# Patient Record
Sex: FEMALE | Race: WHITE
[De-identification: names, ages, dates, MRNs, and addresses within clinical notes are randomized per-mention and may not be internally consistent; named-entity substitution may affect disease eponyms.]

---

## 2017-07-15 ENCOUNTER — HOSPITAL ENCOUNTER (OUTPATIENT)
Dept: HOSPITAL 53 - M SFHCLERA | Age: 42
End: 2017-07-15
Attending: NURSE PRACTITIONER
Payer: COMMERCIAL

## 2017-07-15 DIAGNOSIS — R30.0: Primary | ICD-10-CM

## 2018-07-21 ENCOUNTER — HOSPITAL ENCOUNTER (OUTPATIENT)
Dept: HOSPITAL 53 - M SFHCLERA | Age: 43
End: 2018-07-21
Attending: NURSE PRACTITIONER
Payer: COMMERCIAL

## 2018-07-21 DIAGNOSIS — R30.0: Primary | ICD-10-CM

## 2018-09-12 ENCOUNTER — HOSPITAL ENCOUNTER (OUTPATIENT)
Dept: HOSPITAL 53 - M SFHCLERA | Age: 43
End: 2018-09-12
Attending: NURSE PRACTITIONER
Payer: COMMERCIAL

## 2018-09-12 DIAGNOSIS — J02.9: Primary | ICD-10-CM

## 2019-07-02 ENCOUNTER — HOSPITAL ENCOUNTER (OUTPATIENT)
Dept: HOSPITAL 53 - M LAB | Age: 44
End: 2019-07-02
Attending: NURSE PRACTITIONER
Payer: COMMERCIAL

## 2019-07-02 DIAGNOSIS — E11.9: Primary | ICD-10-CM

## 2019-07-02 LAB
ALBUMIN SERPL BCG-MCNC: 3 GM/DL (ref 3.2–5.2)
ALT SERPL W P-5'-P-CCNC: 16 U/L (ref 12–78)
BILIRUB SERPL-MCNC: 0.3 MG/DL (ref 0.2–1)
BUN SERPL-MCNC: 13 MG/DL (ref 7–18)
CALCIUM SERPL-MCNC: 8.9 MG/DL (ref 8.5–10.1)
CHLORIDE SERPL-SCNC: 105 MEQ/L (ref 98–107)
CHOLEST SERPL-MCNC: 178 MG/DL (ref ?–200)
CHOLEST/HDLC SERPL: 3.56 {RATIO} (ref ?–5)
CO2 SERPL-SCNC: 25 MEQ/L (ref 21–32)
CREAT SERPL-MCNC: 0.58 MG/DL (ref 0.55–1.3)
GFR SERPL CREATININE-BSD FRML MDRD: > 60 ML/MIN/{1.73_M2} (ref 58–?)
GLUCOSE SERPL-MCNC: 102 MG/DL (ref 70–100)
HCT VFR BLD AUTO: 38.5 % (ref 36–47)
HDLC SERPL-MCNC: 50 MG/DL (ref 40–?)
HGB BLD-MCNC: 11.8 G/DL (ref 12–15.5)
LDLC SERPL CALC-MCNC: 84 MG/DL (ref ?–100)
MCH RBC QN AUTO: 23.9 PG (ref 27–33)
MCHC RBC AUTO-ENTMCNC: 30.6 G/DL (ref 32–36.5)
MCV RBC AUTO: 78.1 FL (ref 80–96)
NONHDLC SERPL-MCNC: 128 MG/DL
PLATELET # BLD AUTO: 493 10^3/UL (ref 150–450)
POTASSIUM SERPL-SCNC: 4.7 MEQ/L (ref 3.5–5.1)
PROT SERPL-MCNC: 6.6 GM/DL (ref 6.4–8.2)
RBC # BLD AUTO: 4.93 10^6/UL (ref 4–5.4)
SODIUM SERPL-SCNC: 139 MEQ/L (ref 136–145)
TRIGL SERPL-MCNC: 220 MG/DL (ref ?–150)
WBC # BLD AUTO: 12.4 10^3/UL (ref 4–10)

## 2019-11-02 ENCOUNTER — HOSPITAL ENCOUNTER (OUTPATIENT)
Dept: HOSPITAL 53 - M SFHCLERA | Age: 44
End: 2019-11-02
Attending: PHYSICIAN ASSISTANT
Payer: COMMERCIAL

## 2019-11-02 DIAGNOSIS — R30.9: Primary | ICD-10-CM

## 2020-03-24 ENCOUNTER — HOSPITAL ENCOUNTER (OUTPATIENT)
Dept: HOSPITAL 53 - M OPP | Age: 45
Discharge: HOME | End: 2020-03-24
Attending: INTERNAL MEDICINE
Payer: COMMERCIAL

## 2020-03-24 VITALS — WEIGHT: 256.1 LBS | HEIGHT: 64 IN | BODY MASS INDEX: 43.72 KG/M2

## 2020-03-24 VITALS — SYSTOLIC BLOOD PRESSURE: 144 MMHG | DIASTOLIC BLOOD PRESSURE: 66 MMHG

## 2020-03-24 DIAGNOSIS — K64.8: ICD-10-CM

## 2020-03-24 DIAGNOSIS — D12.3: ICD-10-CM

## 2020-03-24 DIAGNOSIS — Z88.2: ICD-10-CM

## 2020-03-24 DIAGNOSIS — E11.9: ICD-10-CM

## 2020-03-24 DIAGNOSIS — K29.70: ICD-10-CM

## 2020-03-24 DIAGNOSIS — K57.30: ICD-10-CM

## 2020-03-24 DIAGNOSIS — D50.9: Primary | ICD-10-CM

## 2020-03-24 DIAGNOSIS — J45.909: ICD-10-CM

## 2020-03-24 DIAGNOSIS — K20.0: ICD-10-CM

## 2020-03-24 DIAGNOSIS — R13.10: ICD-10-CM

## 2020-03-24 DIAGNOSIS — Z79.899: ICD-10-CM

## 2020-03-24 DIAGNOSIS — K21.9: ICD-10-CM

## 2020-03-24 PROCEDURE — 88305 TISSUE EXAM BY PATHOLOGIST: CPT

## 2020-03-24 PROCEDURE — 43239 EGD BIOPSY SINGLE/MULTIPLE: CPT

## 2020-03-24 PROCEDURE — 45385 COLONOSCOPY W/LESION REMOVAL: CPT

## 2020-03-24 NOTE — ROOR
________________________________________________________________________________

Patient Name: Samaria Taylor             Procedure Date: 3/24/2020 9:33 AM

MRN: [id number]                          Account Number: [id number]

YOB: 1975              Age: 44

Room: [facility]                            Gender: Female

Note Status: Finalized                 

________________________________________________________________________________

 

Procedure:           Colonoscopy

Indications:         Iron deficiency anemia

Providers:           Jd Sullivan MD

Referring MD:        JUAN PABLO JEFFREY MD

Requesting Provider: 

Medicines:           Monitored Anesthesia Care

Complications:       No immediate complications.

________________________________________________________________________________

Procedure:           Pre-Anesthesia Assessment:

                     - Prior to the procedure, a History and Physical was 

                     performed, and patient medications and allergies were 

                     reviewed. The patient is competent. The risks and 

                     benefits of the procedure and the sedation options and 

                     risks were discussed with the patient. All questions were 

                     answered and informed consent was obtained. Patient 

                     identification and proposed procedure were verified by 

                     the physician, the nurse and the anesthesiologist in the 

                     procedure room. Mental Status Examination: alert and 

                     oriented. Airway Examination: normal oropharyngeal airway 

                     and neck mobility. Respiratory Examination: clear to 

                     auscultation. CV Examination: normal. Prophylactic 

                     Antibiotics: The patient does not require prophylactic 

                     antibiotics. Prior Anticoagulants: The patient has taken 

                     no previous anticoagulant or antiplatelet agents. ASA 

                     Grade Assessment: II - A patient with mild systemic 

                     disease. After reviewing the risks and benefits, the 

                     patient was deemed in satisfactory condition to undergo 

                     the procedure. The anesthesia plan was to use monitored 

                     anesthesia care (MAC). Immediately prior to 

                     administration of medications, the patient was 

                     re-assessed for adequacy to receive sedatives. The heart 

                     rate, respiratory rate, oxygen saturations, blood 

                     pressure, adequacy of pulmonary ventilation, and response 

                     to care were monitored throughout the procedure. The 

                     physical status of the patient was re-assessed after the 

                     procedure.

                     The Colonoscope was introduced through the anus and 

                     advanced to the terminal ileum, with identification of 

                     the appendiceal orifice and IC valve. The colonoscopy was 

                     performed without difficulty. The patient tolerated the 

                     procedure well. The quality of the bowel preparation was 

                     good. The terminal ileum, ileocecal valve, appendiceal 

                     orifice, and rectum were photographed. Scope insertion 

                     time was 2 minutes. Scope withdrawal time was 8 minutes. 

                     The total duration of the procedure was 12 minutes.

                                                                                

Findings:

     The perianal and digital rectal examinations were normal.

     The terminal ileum appeared normal.

     A 12 mm polyp was found in the transverse colon. The polyp was sessile. 

     The polyp was removed with a hot snare. Resection and retrieval were 

     complete. Verification of patient identification for the specimen was 

     done by the physician and nurse using the patient's name, birth date and 

     medical record number. Estimated blood loss was minimal.

     Multiple small and large-mouthed diverticula were found from sigmoid to 

     descending colon. There was no evidence of diverticular bleeding.

     Non-bleeding external and internal hemorrhoids were found during 

     retroflexion. The hemorrhoids were medium-sized.

                                                                                

Impression:          - The examined portion of the ileum was normal.

                     - One 12 mm polyp in the transverse colon, removed with a 

                     hot snare. Resected and retrieved.

                     - Moderate diverticulosis from sigmoid to descending 

                     colon. There was no evidence of diverticular bleeding.

                     - Non-bleeding external and internal hemorrhoids.

Recommendation:      - Patient has a contact number available for emergencies. 

                     The signs and symptoms of potential delayed complications 

                     were discussed with the patient. Return to normal 

                     activities tomorrow. Written discharge instructions were 

                     provided to the patient.

                     - High fiber diet.

                     - Continue present medications.

                     - Await pathology results.

                     - Repeat colonoscopy in 3 - 5 years for surveillance 

                     based on pathology results.

                     - Check hemoglobin and hematocrit, serum iron, UIBC and 

                     ferritin levels in 3 months.

                     - Telephone GI clinic for pathology results in 2 weeks.

                     - Return to GI clinic in 3 months.

                     - Return to primary care physician.

                                                                                

 

Jd Sullivan MD

_______________________

Jd Sullivan MD

3/24/2020 10:36:41 AM

Electronically signed by Jd Sullivan MD

Number of Addenda: 0

 

Note Initiated On: 3/24/2020 9:33 AM

Estimated Blood Loss:

     Estimated blood loss was minimal.

## 2020-03-24 NOTE — ROOR
________________________________________________________________________________

Patient Name: Samaria Taylor             Procedure Date: 3/24/2020 9:33 AM

MRN: [id number]                          Account Number: [id number]

YOB: 1975              Age: 44

Room: [facility]                            Gender: Female

Note Status: Finalized                 

________________________________________________________________________________

 

Procedure:           Upper GI endoscopy

Indications:         Dysphagia, Follow-up of eosinophilic esophagitis

Providers:           Jd Sullivan MD

Referring MD:        JUAN PABLO JEFFREY MD

Requesting Provider: 

Medicines:           Monitored Anesthesia Care

Complications:       No immediate complications.

________________________________________________________________________________

Procedure:           Pre-Anesthesia Assessment:

                     - Prior to the procedure, a History and Physical was 

                     performed, and patient medications and allergies were 

                     reviewed. The patient is competent. The risks and 

                     benefits of the procedure and the sedation options and 

                     risks were discussed with the patient. All questions were 

                     answered and informed consent was obtained. Patient 

                     identification and proposed procedure were verified by 

                     the physician, the nurse and the anesthesiologist in the 

                     procedure room. Mental Status Examination: normal. Airway 

                     Examination: normal oropharyngeal airway and neck 

                     mobility. Respiratory Examination: clear to auscultation. 

                     CV Examination: normal. Prophylactic Antibiotics: The 

                     patient does not require prophylactic antibiotics. Prior 

                     Anticoagulants: The patient has taken no previous 

                     anticoagulant or antiplatelet agents. ASA Grade 

                     Assessment: II - A patient with mild systemic disease. 

                     After reviewing the risks and benefits, the patient was 

                     deemed in satisfactory condition to undergo the 

                     procedure. The anesthesia plan was to use monitored 

                     anesthesia care (MAC). Immediately prior to 

                     administration of medications, the patient was 

                     re-assessed for adequacy to receive sedatives. The heart 

                     rate, respiratory rate, oxygen saturations, blood 

                     pressure, adequacy of pulmonary ventilation, and response 

                     to care were monitored throughout the procedure. The 

                     physical status of the patient was re-assessed after the 

                     procedure.

                     The Endoscope was introduced through the mouth, and 

                     advanced to the second part of duodenum. The upper GI 

                     endoscopy was accomplished without difficulty. The 

                     patient tolerated the procedure well.

                                                                                

Findings:

     Mucosal changes including ringed esophagus, longitudinal furrows and 

     white plaques were found in the middle third of the esophagus and in the 

     lower third of the esophagus. Biopsies were obtained from the proximal 

     and distal esophagus with cold forceps for histology of suspected 

     eosinophilic esophagitis. Verification of patient identification for the 

     specimen was done by the physician and nurse using the patient's name, 

     birth date and medical record number. Estimated blood loss was minimal.

     Scattered minimal inflammation characterized by erythema and granularity 

     was found in the gastric antrum. Biopsies were taken with a cold forceps 

     for Helicobacter pylori testing.

     No gross lesions were noted in the duodenal bulb and in the second 

     portion of the duodenum. Biopsies for histology were taken with a cold 

     forceps for evaluation of celiac disease.

                                                                                

Impression:          - Esophageal mucosal changes secondary to eosinophilic 

                     esophagitis. Biopsied.

                     - Gastritis. Biopsied.

                     - No gross lesions in the duodenal bulb and in the second 

                     portion of the duodenum. Biopsied.

Recommendation:      - Patient has a contact number available for emergencies. 

                     The signs and symptoms of potential delayed complications 

                     were discussed with the patient. Return to normal 

                     activities tomorrow. Written discharge instructions were 

                     provided to the patient.

                     - Resume previous diet.

                     - Avoid the food allergens. Follow Six Food Elimination 

                     Diet ( Avoid -- milk, soy, eggs, wheat, peanuts/tree 

                     nuts, and seafood), until allergy testing is done.

                     - Continue present medications.

                     - Await pathology results.

                     - Follow an antireflux regimen.

                     - Telephone GI clinic for pathology results in 2 weeks.

                     - Check hemoglobin and hematocrit, serum iron, UIBC, 

                     ferritin levels in 3 months.

                     - Return to primary care physician.

                     - Return to GI clinic in 3 months.

                                                                                

 

Jd Sullivan MD

_______________________

Jd Sullivan MD

3/24/2020 10:33:53 AM

Electronically signed by Jd Sullivan MD

Number of Addenda: 0

 

Note Initiated On: 3/24/2020 9:33 AM

Estimated Blood Loss:

     Estimated blood loss was minimal.

## 2020-12-08 ENCOUNTER — HOSPITAL ENCOUNTER (OUTPATIENT)
Dept: HOSPITAL 53 - M LABCAHC | Age: 45
End: 2020-12-08
Attending: PEDIATRICS
Payer: SELF-PAY

## 2020-12-08 DIAGNOSIS — Z11.59: Primary | ICD-10-CM

## 2023-05-02 ENCOUNTER — HOSPITAL ENCOUNTER (OUTPATIENT)
Dept: HOSPITAL 53 - M OPP | Age: 48
Discharge: HOME | End: 2023-05-02
Attending: INTERNAL MEDICINE
Payer: COMMERCIAL

## 2023-05-02 VITALS — DIASTOLIC BLOOD PRESSURE: 79 MMHG | SYSTOLIC BLOOD PRESSURE: 129 MMHG

## 2023-05-02 VITALS — WEIGHT: 240 LBS | BODY MASS INDEX: 40.97 KG/M2 | HEIGHT: 64 IN

## 2023-05-02 DIAGNOSIS — Z88.2: ICD-10-CM

## 2023-05-02 DIAGNOSIS — K20.0: ICD-10-CM

## 2023-05-02 DIAGNOSIS — K29.70: ICD-10-CM

## 2023-05-02 DIAGNOSIS — K22.89: ICD-10-CM

## 2023-05-02 DIAGNOSIS — Z79.02: ICD-10-CM

## 2023-05-02 DIAGNOSIS — Z86.010: ICD-10-CM

## 2023-05-02 DIAGNOSIS — Z12.11: Primary | ICD-10-CM

## 2023-05-02 DIAGNOSIS — K64.8: ICD-10-CM

## 2023-05-02 DIAGNOSIS — K63.5: ICD-10-CM

## 2023-05-02 DIAGNOSIS — K64.4: ICD-10-CM

## 2023-05-02 DIAGNOSIS — Z79.899: ICD-10-CM

## 2023-05-02 DIAGNOSIS — Z79.84: ICD-10-CM

## 2023-05-02 PROCEDURE — 43239 EGD BIOPSY SINGLE/MULTIPLE: CPT

## 2023-05-02 PROCEDURE — 45385 COLONOSCOPY W/LESION REMOVAL: CPT

## 2023-05-02 PROCEDURE — 88305 TISSUE EXAM BY PATHOLOGIST: CPT
